# Patient Record
Sex: FEMALE | Race: BLACK OR AFRICAN AMERICAN | URBAN - METROPOLITAN AREA
[De-identification: names, ages, dates, MRNs, and addresses within clinical notes are randomized per-mention and may not be internally consistent; named-entity substitution may affect disease eponyms.]

---

## 2021-11-09 ENCOUNTER — OFFICE VISIT (OUTPATIENT)
Dept: URBAN - METROPOLITAN AREA CLINIC 128 | Facility: CLINIC | Age: 46
End: 2021-11-09

## 2021-12-01 ENCOUNTER — OFFICE VISIT (OUTPATIENT)
Dept: URBAN - METROPOLITAN AREA CLINIC 128 | Facility: CLINIC | Age: 46
End: 2021-12-01

## 2021-12-21 ENCOUNTER — OFFICE VISIT (OUTPATIENT)
Dept: URBAN - METROPOLITAN AREA CLINIC 128 | Facility: CLINIC | Age: 46
End: 2021-12-21
Payer: COMMERCIAL

## 2021-12-21 VITALS
HEIGHT: 66 IN | WEIGHT: 202.4 LBS | HEART RATE: 88 BPM | TEMPERATURE: 97.7 F | SYSTOLIC BLOOD PRESSURE: 122 MMHG | DIASTOLIC BLOOD PRESSURE: 76 MMHG | BODY MASS INDEX: 32.53 KG/M2

## 2021-12-21 DIAGNOSIS — Z80.0 FAMILY HISTORY OF COLON CANCER IN MOTHER: ICD-10-CM

## 2021-12-21 DIAGNOSIS — K59.00 CONSTIPATION, UNSPECIFIED CONSTIPATION TYPE: ICD-10-CM

## 2021-12-21 DIAGNOSIS — Z86.010 PERSONAL HISTORY OF COLONIC POLYPS: ICD-10-CM

## 2021-12-21 PROCEDURE — 99203 OFFICE O/P NEW LOW 30 MIN: CPT | Performed by: PHYSICIAN ASSISTANT

## 2021-12-21 RX ORDER — LINACLOTIDE 145 UG/1
1 CAPSULE AT LEAST 30 MINUTES BEFORE THE FIRST MEAL OF THE DAY ON AN EMPTY STOMACH CAPSULE, GELATIN COATED ORAL ONCE A DAY
Qty: 90 | Refills: 3 | OUTPATIENT
Start: 2021-12-21 | End: 2022-12-16

## 2021-12-21 NOTE — HPI-OTHER HISTORIES
The patient presents for a colonoscopy.  There is a family history of colon polyps and colon cancer with her mother. Patient denies change in bowel habits, appetite, and weight. Patient denies rectal bleeding. Last colonoscopy: 2019 was normal with no polyps noted. She has had benign polyps per patient in the past.  She states she was told to repeat the colonoscopy in 3 years.

## 2022-03-09 ENCOUNTER — OFFICE VISIT (OUTPATIENT)
Dept: URBAN - METROPOLITAN AREA SURGERY CENTER 31 | Facility: SURGERY CENTER | Age: 47
End: 2022-03-09

## 2022-03-10 ENCOUNTER — LAB OUTSIDE AN ENCOUNTER (OUTPATIENT)
Dept: URBAN - METROPOLITAN AREA CLINIC 128 | Facility: CLINIC | Age: 47
End: 2022-03-10

## 2022-03-12 LAB
A/G RATIO: 1.3
ALBUMIN: 4
ALKALINE PHOSPHATASE: 91
ALT (SGPT): 10
AST (SGOT): 16
BASO (ABSOLUTE): 0
BASOS: 0
BILIRUBIN, TOTAL: 1
BUN/CREATININE RATIO: 13
BUN: 9
CALCIUM: 9.4
CARBON DIOXIDE, TOTAL: 22
CHLORIDE: 103
CREATININE: 0.71
DEAMIDATED GLIADIN ABS, IGA: 5
DEAMIDATED GLIADIN ABS, IGG: 3
EGFR: 107
ENDOMYSIAL ANTIBODY IGA: NEGATIVE
EOS (ABSOLUTE): 0.1
EOS: 1
GLOBULIN, TOTAL: 3
GLUCOSE: 80
HEMATOCRIT: 42.5
HEMATOLOGY COMMENTS:: (no result)
HEMOGLOBIN: 13.9
IMMATURE CELLS: (no result)
IMMATURE GRANS (ABS): 0
IMMATURE GRANULOCYTES: 0
IMMUNOGLOBULIN A, QN, SERUM: 501
LYMPHS (ABSOLUTE): 2.4
LYMPHS: 45
MCH: 29.6
MCHC: 32.7
MCV: 90
MONOCYTES(ABSOLUTE): 0.4
MONOCYTES: 7
NEUTROPHILS (ABSOLUTE): 2.5
NEUTROPHILS: 47
NRBC: (no result)
PLATELETS: 329
POTASSIUM: 4.6
PROTEIN, TOTAL: 7
RBC: 4.7
RDW: 12.1
SODIUM: 139
T-TRANSGLUTAMINASE (TTG) IGA: 3
T-TRANSGLUTAMINASE (TTG) IGG: 3
TSH: 1.42
WBC: 5.4

## 2022-04-01 PROBLEM — 428283002: Status: ACTIVE | Noted: 2021-12-21

## 2022-04-01 PROBLEM — 14760008: Status: ACTIVE | Noted: 2021-12-21

## 2022-04-13 ENCOUNTER — CLAIMS CREATED FROM THE CLAIM WINDOW (OUTPATIENT)
Dept: URBAN - METROPOLITAN AREA CLINIC 4 | Facility: CLINIC | Age: 47
End: 2022-04-13
Payer: COMMERCIAL

## 2022-04-13 ENCOUNTER — OFFICE VISIT (OUTPATIENT)
Dept: URBAN - METROPOLITAN AREA SURGERY CENTER 31 | Facility: SURGERY CENTER | Age: 47
End: 2022-04-13
Payer: COMMERCIAL

## 2022-04-13 DIAGNOSIS — Z80.0 BROTHER AT YOUNG AGE FAMILY HISTORY OF COLON CANCER: ICD-10-CM

## 2022-04-13 DIAGNOSIS — K63.89 CYST OF DUODENUM: ICD-10-CM

## 2022-04-13 DIAGNOSIS — Z83.71 FAMILY HISTORY OF COLON POLYPS: ICD-10-CM

## 2022-04-13 DIAGNOSIS — K63.5 BENIGN COLON POLYP: ICD-10-CM

## 2022-04-13 DIAGNOSIS — Z12.11 COLON CANCER SCREENING: ICD-10-CM

## 2022-04-13 PROCEDURE — 88305 TISSUE EXAM BY PATHOLOGIST: CPT | Performed by: PATHOLOGY

## 2022-04-13 PROCEDURE — 45380 COLONOSCOPY AND BIOPSY: CPT | Performed by: INTERNAL MEDICINE

## 2022-04-13 PROCEDURE — G8907 PT DOC NO EVENTS ON DISCHARG: HCPCS | Performed by: INTERNAL MEDICINE

## 2022-04-13 RX ORDER — LINACLOTIDE 145 UG/1
1 CAPSULE AT LEAST 30 MINUTES BEFORE THE FIRST MEAL OF THE DAY ON AN EMPTY STOMACH CAPSULE, GELATIN COATED ORAL ONCE A DAY
Qty: 90 | Refills: 3 | Status: ACTIVE | COMMUNITY
Start: 2021-12-21 | End: 2022-12-16

## 2022-04-18 ENCOUNTER — TELEPHONE ENCOUNTER (OUTPATIENT)
Dept: URBAN - METROPOLITAN AREA CLINIC 92 | Facility: CLINIC | Age: 47
End: 2022-04-18

## 2022-04-18 ENCOUNTER — TELEPHONE ENCOUNTER (OUTPATIENT)
Dept: URBAN - METROPOLITAN AREA CLINIC 128 | Facility: CLINIC | Age: 47
End: 2022-04-18

## 2022-04-18 PROBLEM — 35298007: Status: ACTIVE | Noted: 2022-04-18

## 2023-08-04 ENCOUNTER — OFFICE VISIT (OUTPATIENT)
Dept: URBAN - METROPOLITAN AREA CLINIC 128 | Facility: CLINIC | Age: 48
End: 2023-08-04

## 2023-10-13 ENCOUNTER — OFFICE VISIT (OUTPATIENT)
Dept: URBAN - METROPOLITAN AREA CLINIC 128 | Facility: CLINIC | Age: 48
End: 2023-10-13
Payer: COMMERCIAL

## 2023-10-13 ENCOUNTER — DASHBOARD ENCOUNTERS (OUTPATIENT)
Age: 48
End: 2023-10-13

## 2023-10-13 VITALS
SYSTOLIC BLOOD PRESSURE: 118 MMHG | HEART RATE: 88 BPM | BODY MASS INDEX: 36.58 KG/M2 | TEMPERATURE: 97.8 F | WEIGHT: 227.6 LBS | DIASTOLIC BLOOD PRESSURE: 78 MMHG | HEIGHT: 66 IN

## 2023-10-13 DIAGNOSIS — Z86.010 PERSONAL HISTORY OF COLONIC POLYPS: ICD-10-CM

## 2023-10-13 DIAGNOSIS — K59.01 CONSTIPATION: ICD-10-CM

## 2023-10-13 DIAGNOSIS — Z80.0 FAMILY HISTORY OF COLON CANCER IN MOTHER: ICD-10-CM

## 2023-10-13 PROCEDURE — 99214 OFFICE O/P EST MOD 30 MIN: CPT | Performed by: PHYSICIAN ASSISTANT

## 2023-10-13 RX ORDER — LINACLOTIDE 145 UG/1
1 CAPSULE AT LEAST 30 MINUTES BEFORE THE FIRST MEAL OF THE DAY ON AN EMPTY STOMACH CAPSULE, GELATIN COATED ORAL ONCE A DAY
Qty: 30 | Refills: 11 | OUTPATIENT
Start: 2023-10-13 | End: 2024-10-07

## 2023-10-13 RX ORDER — MULTIVITAMIN
1 TABLET TABLET ORAL ONCE A DAY
Status: ACTIVE | COMMUNITY

## 2023-10-13 NOTE — HPI-OTHER HISTORIES
The patient presents for a follow up. She has known constipation and has been on lizness 72mcg qd.  There is a family history of colon polyps and colon cancer with her mother. Patient denies change in bowel habits, appetite, and weight. Patient denies rectal bleeding. Previous colonoscopy: 2019 was normal with no polyps noted. She has had benign polyps per patient in the past.  She states she was told to repeat the colonoscopy in 3 years. Last colonoscopy wa s2022 a hyperpalstic polyp was noted and a repeat colonoscopy in 3 years was advised.

## 2024-02-07 ENCOUNTER — OV EP (OUTPATIENT)
Dept: URBAN - METROPOLITAN AREA CLINIC 128 | Facility: CLINIC | Age: 49
End: 2024-02-07

## 2025-01-29 ENCOUNTER — OFFICE VISIT (OUTPATIENT)
Dept: URBAN - METROPOLITAN AREA CLINIC 19 | Facility: CLINIC | Age: 50
End: 2025-01-29
Payer: COMMERCIAL

## 2025-01-29 VITALS
HEART RATE: 72 BPM | BODY MASS INDEX: 35.71 KG/M2 | OXYGEN SATURATION: 100 % | HEIGHT: 66 IN | WEIGHT: 222.2 LBS | SYSTOLIC BLOOD PRESSURE: 126 MMHG | DIASTOLIC BLOOD PRESSURE: 67 MMHG | TEMPERATURE: 98.1 F

## 2025-01-29 DIAGNOSIS — Z80.0 FAMILY HISTORY OF COLON CANCER MOTHER: ICD-10-CM

## 2025-01-29 DIAGNOSIS — Z86.0100 PERSONAL HISTORY OF COLON POLYPS, UNSPECIFIED: ICD-10-CM

## 2025-01-29 PROCEDURE — 99212 OFFICE O/P EST SF 10 MIN: CPT | Performed by: NURSE PRACTITIONER

## 2025-01-29 RX ORDER — MULTIVITAMIN
1 TABLET TABLET ORAL ONCE A DAY
Status: ACTIVE | COMMUNITY

## 2025-01-29 NOTE — PHYSICAL EXAM CONSTITUTIONAL:
Well developed, well nourished, in no acute distress, ambulating without difficulty, normal communication ability.

## 2025-01-29 NOTE — HPI-TODAY'S VISIT:
49-year-old female with PMH of HTN, chronic constipation presents for surveillance colonoscopy.   Last colonoscopy 4/13/2022 with Dr. Alberto - hyperplastic polyp noted. 3-year follow-up given    She was last seen in GI clinic in 2023 by Yolanda Conteh. She was taking Linzess at the time but no longer on it. She is managing well with Metamucil. Has a BM every other day. No abd or rectal pain. No blood in stool.  Mother had colon cancer diagnosed late 50's      She had a colonoscopy in 2019 that was normal besides diverticulosis.

## 2025-02-27 ENCOUNTER — OFFICE VISIT (OUTPATIENT)
Dept: URBAN - METROPOLITAN AREA SURGERY CENTER 31 | Facility: SURGERY CENTER | Age: 50
End: 2025-02-27